# Patient Record
Sex: FEMALE | Race: BLACK OR AFRICAN AMERICAN | NOT HISPANIC OR LATINO | Employment: OTHER | URBAN - METROPOLITAN AREA
[De-identification: names, ages, dates, MRNs, and addresses within clinical notes are randomized per-mention and may not be internally consistent; named-entity substitution may affect disease eponyms.]

---

## 2018-12-03 ENCOUNTER — HOSPITAL ENCOUNTER (OUTPATIENT)
Facility: MEDICAL CENTER | Age: 73
End: 2018-12-04
Attending: EMERGENCY MEDICINE | Admitting: HOSPITALIST
Payer: COMMERCIAL

## 2018-12-03 ENCOUNTER — APPOINTMENT (OUTPATIENT)
Dept: RADIOLOGY | Facility: MEDICAL CENTER | Age: 73
End: 2018-12-03
Attending: EMERGENCY MEDICINE
Payer: COMMERCIAL

## 2018-12-03 DIAGNOSIS — I50.9 ACUTE CONGESTIVE HEART FAILURE, UNSPECIFIED HEART FAILURE TYPE (HCC): ICD-10-CM

## 2018-12-03 DIAGNOSIS — N28.9 RENAL INSUFFICIENCY: ICD-10-CM

## 2018-12-03 DIAGNOSIS — R55 NEAR SYNCOPE: ICD-10-CM

## 2018-12-03 PROBLEM — N17.9 AKI (ACUTE KIDNEY INJURY) (HCC): Status: ACTIVE | Noted: 2018-12-03

## 2018-12-03 PROBLEM — I16.0 HYPERTENSIVE URGENCY: Status: ACTIVE | Noted: 2018-12-03

## 2018-12-03 PROBLEM — E11.9 DIABETES (HCC): Status: ACTIVE | Noted: 2018-12-03

## 2018-12-03 LAB
ALBUMIN SERPL BCP-MCNC: 3.4 G/DL (ref 3.2–4.9)
ALBUMIN/GLOB SERPL: 0.8 G/DL
ALP SERPL-CCNC: 94 U/L (ref 30–99)
ALT SERPL-CCNC: 35 U/L (ref 2–50)
ANION GAP SERPL CALC-SCNC: 7 MMOL/L (ref 0–11.9)
ANION GAP SERPL CALC-SCNC: 9 MMOL/L (ref 0–11.9)
APPEARANCE UR: CLEAR
AST SERPL-CCNC: 22 U/L (ref 12–45)
BACTERIA #/AREA URNS HPF: ABNORMAL /HPF
BASOPHILS # BLD AUTO: 0.4 % (ref 0–1.8)
BASOPHILS # BLD: 0.04 K/UL (ref 0–0.12)
BILIRUB SERPL-MCNC: 0.3 MG/DL (ref 0.1–1.5)
BILIRUB UR QL STRIP.AUTO: NEGATIVE
BNP SERPL-MCNC: 933 PG/ML (ref 0–100)
BUN SERPL-MCNC: 50 MG/DL (ref 8–22)
BUN SERPL-MCNC: 54 MG/DL (ref 8–22)
CALCIUM SERPL-MCNC: 8.1 MG/DL (ref 8.5–10.5)
CALCIUM SERPL-MCNC: 8.6 MG/DL (ref 8.5–10.5)
CHLORIDE SERPL-SCNC: 105 MMOL/L (ref 96–112)
CHLORIDE SERPL-SCNC: 106 MMOL/L (ref 96–112)
CO2 SERPL-SCNC: 23 MMOL/L (ref 20–33)
CO2 SERPL-SCNC: 24 MMOL/L (ref 20–33)
COLOR UR: YELLOW
CORTIS SERPL-MCNC: 14.7 UG/DL (ref 0–23)
CREAT SERPL-MCNC: 2.41 MG/DL (ref 0.5–1.4)
CREAT SERPL-MCNC: 2.44 MG/DL (ref 0.5–1.4)
CREAT UR-MCNC: 43.9 MG/DL
D DIMER PPP IA.FEU-MCNC: 1.29 UG/ML (FEU) (ref 0–0.5)
EOSINOPHIL # BLD AUTO: 0.09 K/UL (ref 0–0.51)
EOSINOPHIL NFR BLD: 0.9 % (ref 0–6.9)
EPI CELLS #/AREA URNS HPF: ABNORMAL /HPF
ERYTHROCYTE [DISTWIDTH] IN BLOOD BY AUTOMATED COUNT: 54.7 FL (ref 35.9–50)
GLOBULIN SER CALC-MCNC: 4.3 G/DL (ref 1.9–3.5)
GLUCOSE BLD-MCNC: 94 MG/DL (ref 65–99)
GLUCOSE SERPL-MCNC: 192 MG/DL (ref 65–99)
GLUCOSE SERPL-MCNC: 87 MG/DL (ref 65–99)
GLUCOSE UR STRIP.AUTO-MCNC: NEGATIVE MG/DL
HCT VFR BLD AUTO: 32.6 % (ref 37–47)
HGB BLD-MCNC: 9.9 G/DL (ref 12–16)
IMM GRANULOCYTES # BLD AUTO: 0.04 K/UL (ref 0–0.11)
IMM GRANULOCYTES NFR BLD AUTO: 0.4 % (ref 0–0.9)
INR PPP: 1.2 (ref 0.87–1.13)
KETONES UR STRIP.AUTO-MCNC: NEGATIVE MG/DL
LEUKOCYTE ESTERASE UR QL STRIP.AUTO: ABNORMAL
LIPASE SERPL-CCNC: 118 U/L (ref 11–82)
LYMPHOCYTES # BLD AUTO: 1.44 K/UL (ref 1–4.8)
LYMPHOCYTES NFR BLD: 14.9 % (ref 22–41)
MAGNESIUM SERPL-MCNC: 1.7 MG/DL (ref 1.5–2.5)
MCH RBC QN AUTO: 30.1 PG (ref 27–33)
MCHC RBC AUTO-ENTMCNC: 30.4 G/DL (ref 33.6–35)
MCV RBC AUTO: 99.1 FL (ref 81.4–97.8)
MICRO URNS: ABNORMAL
MONOCYTES # BLD AUTO: 0.92 K/UL (ref 0–0.85)
MONOCYTES NFR BLD AUTO: 9.5 % (ref 0–13.4)
NEUTROPHILS # BLD AUTO: 7.16 K/UL (ref 2–7.15)
NEUTROPHILS NFR BLD: 73.9 % (ref 44–72)
NITRITE UR QL STRIP.AUTO: NEGATIVE
NRBC # BLD AUTO: 0 K/UL
NRBC BLD-RTO: 0 /100 WBC
PH UR STRIP.AUTO: 5.5 [PH]
PLATELET # BLD AUTO: 325 K/UL (ref 164–446)
PMV BLD AUTO: 10.4 FL (ref 9–12.9)
POTASSIUM SERPL-SCNC: 3.9 MMOL/L (ref 3.6–5.5)
POTASSIUM SERPL-SCNC: 4.3 MMOL/L (ref 3.6–5.5)
PROT SERPL-MCNC: 7.7 G/DL (ref 6–8.2)
PROT UR QL STRIP: 100 MG/DL
PROT UR-MCNC: 161.4 MG/DL (ref 0–15)
PROTHROMBIN TIME: 15.3 SEC (ref 12–14.6)
RBC # BLD AUTO: 3.29 M/UL (ref 4.2–5.4)
RBC # URNS HPF: ABNORMAL /HPF
RBC UR QL AUTO: ABNORMAL
SODIUM SERPL-SCNC: 136 MMOL/L (ref 135–145)
SODIUM SERPL-SCNC: 138 MMOL/L (ref 135–145)
SODIUM UR-SCNC: 99 MMOL/L
SP GR UR STRIP.AUTO: 1.02
TROPONIN I SERPL-MCNC: 0.03 NG/ML (ref 0–0.04)
UROBILINOGEN UR STRIP.AUTO-MCNC: 1 MG/DL
WBC # BLD AUTO: 9.7 K/UL (ref 4.8–10.8)
WBC #/AREA URNS HPF: ABNORMAL /HPF

## 2018-12-03 PROCEDURE — 83735 ASSAY OF MAGNESIUM: CPT

## 2018-12-03 PROCEDURE — 700111 HCHG RX REV CODE 636 W/ 250 OVERRIDE (IP): Performed by: HOSPITALIST

## 2018-12-03 PROCEDURE — 83880 ASSAY OF NATRIURETIC PEPTIDE: CPT

## 2018-12-03 PROCEDURE — 84300 ASSAY OF URINE SODIUM: CPT

## 2018-12-03 PROCEDURE — 84156 ASSAY OF PROTEIN URINE: CPT

## 2018-12-03 PROCEDURE — 82533 TOTAL CORTISOL: CPT

## 2018-12-03 PROCEDURE — 99285 EMERGENCY DEPT VISIT HI MDM: CPT

## 2018-12-03 PROCEDURE — 99220 PR INITIAL OBSERVATION CARE,LEVL III: CPT | Performed by: HOSPITALIST

## 2018-12-03 PROCEDURE — A9270 NON-COVERED ITEM OR SERVICE: HCPCS | Performed by: HOSPITALIST

## 2018-12-03 PROCEDURE — 80048 BASIC METABOLIC PNL TOTAL CA: CPT

## 2018-12-03 PROCEDURE — 96372 THER/PROPH/DIAG INJ SC/IM: CPT

## 2018-12-03 PROCEDURE — 36415 COLL VENOUS BLD VENIPUNCTURE: CPT

## 2018-12-03 PROCEDURE — 82570 ASSAY OF URINE CREATININE: CPT

## 2018-12-03 PROCEDURE — 700111 HCHG RX REV CODE 636 W/ 250 OVERRIDE (IP)

## 2018-12-03 PROCEDURE — 700105 HCHG RX REV CODE 258: Performed by: HOSPITALIST

## 2018-12-03 PROCEDURE — 84484 ASSAY OF TROPONIN QUANT: CPT

## 2018-12-03 PROCEDURE — 81001 URINALYSIS AUTO W/SCOPE: CPT

## 2018-12-03 PROCEDURE — G0378 HOSPITAL OBSERVATION PER HR: HCPCS

## 2018-12-03 PROCEDURE — 85610 PROTHROMBIN TIME: CPT

## 2018-12-03 PROCEDURE — 85025 COMPLETE CBC W/AUTO DIFF WBC: CPT

## 2018-12-03 PROCEDURE — 82962 GLUCOSE BLOOD TEST: CPT

## 2018-12-03 PROCEDURE — 700102 HCHG RX REV CODE 250 W/ 637 OVERRIDE(OP): Performed by: HOSPITALIST

## 2018-12-03 PROCEDURE — 80053 COMPREHEN METABOLIC PANEL: CPT

## 2018-12-03 PROCEDURE — 93005 ELECTROCARDIOGRAM TRACING: CPT | Performed by: EMERGENCY MEDICINE

## 2018-12-03 PROCEDURE — 96374 THER/PROPH/DIAG INJ IV PUSH: CPT

## 2018-12-03 PROCEDURE — 71045 X-RAY EXAM CHEST 1 VIEW: CPT

## 2018-12-03 PROCEDURE — 85379 FIBRIN DEGRADATION QUANT: CPT

## 2018-12-03 PROCEDURE — 83690 ASSAY OF LIPASE: CPT

## 2018-12-03 RX ORDER — BISACODYL 10 MG
10 SUPPOSITORY, RECTAL RECTAL
Status: DISCONTINUED | OUTPATIENT
Start: 2018-12-03 | End: 2018-12-04 | Stop reason: HOSPADM

## 2018-12-03 RX ORDER — INSULIN GLARGINE 100 [IU]/ML
10 INJECTION, SOLUTION SUBCUTANEOUS
Status: DISCONTINUED | OUTPATIENT
Start: 2018-12-03 | End: 2018-12-04 | Stop reason: HOSPADM

## 2018-12-03 RX ORDER — ASPIRIN 81 MG/1
81 TABLET, CHEWABLE ORAL DAILY
COMMUNITY

## 2018-12-03 RX ORDER — FUROSEMIDE 10 MG/ML
20 INJECTION INTRAMUSCULAR; INTRAVENOUS ONCE
Status: COMPLETED | OUTPATIENT
Start: 2018-12-03 | End: 2018-12-03

## 2018-12-03 RX ORDER — SODIUM CHLORIDE 9 MG/ML
INJECTION, SOLUTION INTRAVENOUS CONTINUOUS
Status: DISCONTINUED | OUTPATIENT
Start: 2018-12-03 | End: 2018-12-04 | Stop reason: HOSPADM

## 2018-12-03 RX ORDER — AMOXICILLIN 250 MG
2 CAPSULE ORAL 2 TIMES DAILY
Status: DISCONTINUED | OUTPATIENT
Start: 2018-12-04 | End: 2018-12-04 | Stop reason: HOSPADM

## 2018-12-03 RX ORDER — FUROSEMIDE 40 MG/1
40 TABLET ORAL DAILY
Status: ON HOLD | COMMUNITY
End: 2018-12-04

## 2018-12-03 RX ORDER — HYDRALAZINE HYDROCHLORIDE 25 MG/1
25 TABLET, FILM COATED ORAL 3 TIMES DAILY
Status: DISCONTINUED | OUTPATIENT
Start: 2018-12-03 | End: 2018-12-04 | Stop reason: HOSPADM

## 2018-12-03 RX ORDER — HYDRALAZINE HYDROCHLORIDE 10 MG/1
5 TABLET, FILM COATED ORAL EVERY 6 HOURS PRN
Status: DISCONTINUED | OUTPATIENT
Start: 2018-12-03 | End: 2018-12-04 | Stop reason: HOSPADM

## 2018-12-03 RX ORDER — ACETAMINOPHEN 325 MG/1
650 TABLET ORAL EVERY 6 HOURS PRN
Status: DISCONTINUED | OUTPATIENT
Start: 2018-12-03 | End: 2018-12-04 | Stop reason: HOSPADM

## 2018-12-03 RX ORDER — ISOSORBIDE DINITRATE 10 MG/1
10 TABLET ORAL 3 TIMES DAILY
COMMUNITY

## 2018-12-03 RX ORDER — HYDRALAZINE HYDROCHLORIDE 25 MG/1
25 TABLET, FILM COATED ORAL 3 TIMES DAILY
COMMUNITY

## 2018-12-03 RX ORDER — ISOSORBIDE DINITRATE 10 MG/1
10 TABLET ORAL 3 TIMES DAILY
Status: DISCONTINUED | OUTPATIENT
Start: 2018-12-03 | End: 2018-12-04 | Stop reason: HOSPADM

## 2018-12-03 RX ORDER — CARVEDILOL 25 MG/1
25 TABLET ORAL 2 TIMES DAILY WITH MEALS
COMMUNITY

## 2018-12-03 RX ORDER — FUROSEMIDE 40 MG/1
40 TABLET ORAL DAILY
Status: DISCONTINUED | OUTPATIENT
Start: 2018-12-04 | End: 2018-12-03

## 2018-12-03 RX ORDER — HEPARIN SODIUM 5000 [USP'U]/ML
5000 INJECTION, SOLUTION INTRAVENOUS; SUBCUTANEOUS EVERY 8 HOURS
Status: DISCONTINUED | OUTPATIENT
Start: 2018-12-03 | End: 2018-12-04 | Stop reason: HOSPADM

## 2018-12-03 RX ORDER — BUDESONIDE AND FORMOTEROL FUMARATE DIHYDRATE 160; 4.5 UG/1; UG/1
2 AEROSOL RESPIRATORY (INHALATION)
COMMUNITY

## 2018-12-03 RX ORDER — CARVEDILOL 6.25 MG/1
25 TABLET ORAL 2 TIMES DAILY WITH MEALS
Status: DISCONTINUED | OUTPATIENT
Start: 2018-12-03 | End: 2018-12-04 | Stop reason: HOSPADM

## 2018-12-03 RX ORDER — HYDRALAZINE HYDROCHLORIDE 20 MG/ML
20 INJECTION INTRAMUSCULAR; INTRAVENOUS EVERY 6 HOURS PRN
Status: DISCONTINUED | OUTPATIENT
Start: 2018-12-03 | End: 2018-12-04 | Stop reason: HOSPADM

## 2018-12-03 RX ORDER — POLYETHYLENE GLYCOL 3350 17 G/17G
1 POWDER, FOR SOLUTION ORAL
Status: DISCONTINUED | OUTPATIENT
Start: 2018-12-03 | End: 2018-12-04 | Stop reason: HOSPADM

## 2018-12-03 RX ORDER — DEXTROSE MONOHYDRATE 25 G/50ML
25 INJECTION, SOLUTION INTRAVENOUS
Status: DISCONTINUED | OUTPATIENT
Start: 2018-12-03 | End: 2018-12-04 | Stop reason: HOSPADM

## 2018-12-03 RX ADMIN — HEPARIN SODIUM 5000 UNITS: 5000 INJECTION, SOLUTION INTRAVENOUS; SUBCUTANEOUS at 21:29

## 2018-12-03 RX ADMIN — HYDRALAZINE HYDROCHLORIDE 25 MG: 25 TABLET, FILM COATED ORAL at 21:15

## 2018-12-03 RX ADMIN — CARVEDILOL 25 MG: 6.25 TABLET, FILM COATED ORAL at 21:18

## 2018-12-03 RX ADMIN — INSULIN GLARGINE 10 UNITS: 100 INJECTION, SOLUTION SUBCUTANEOUS at 21:59

## 2018-12-03 RX ADMIN — SODIUM CHLORIDE: 9 INJECTION, SOLUTION INTRAVENOUS at 21:16

## 2018-12-03 RX ADMIN — ISOSORBIDE DINITRATE 10 MG: 10 TABLET ORAL at 21:30

## 2018-12-03 RX ADMIN — FUROSEMIDE 20 MG: 10 INJECTION, SOLUTION INTRAMUSCULAR; INTRAVENOUS at 17:44

## 2018-12-03 ASSESSMENT — COPD QUESTIONNAIRES
HAVE YOU SMOKED AT LEAST 100 CIGARETTES IN YOUR ENTIRE LIFE: YES
COPD SCREENING SCORE: 4
DURING THE PAST 4 WEEKS HOW MUCH DID YOU FEEL SHORT OF BREATH: NONE/LITTLE OF THE TIME
DO YOU EVER COUGH UP ANY MUCUS OR PHLEGM?: NO/ONLY WITH OCCASIONAL COLDS OR INFECTIONS

## 2018-12-03 ASSESSMENT — PATIENT HEALTH QUESTIONNAIRE - PHQ9
1. LITTLE INTEREST OR PLEASURE IN DOING THINGS: NOT AT ALL
SUM OF ALL RESPONSES TO PHQ9 QUESTIONS 1 AND 2: 0
2. FEELING DOWN, DEPRESSED, IRRITABLE, OR HOPELESS: NOT AT ALL

## 2018-12-03 ASSESSMENT — PAIN SCALES - GENERAL
PAINLEVEL_OUTOF10: 0
PAINLEVEL_OUTOF10: 5

## 2018-12-03 ASSESSMENT — LIFESTYLE VARIABLES
EVER_SMOKED: NEVER
EVER_SMOKED: YES
DO YOU DRINK ALCOHOL: NO

## 2018-12-03 NOTE — ED NOTES
Pt incontinent of urine. Linen and pt cleaned. Pt slow to answer questions, seemingly confused however answers questions correctly. Pt and family updated on POC.

## 2018-12-03 NOTE — ED TRIAGE NOTES
Chief Complaint   Patient presents with   • Near Syncopal     Patient bib by ems for above complaint. Patient is visiting from [facility] since Wednesday. Patient was about to fly home today and was on the plane and they called ems. When EMS arrived she had a near syncopal event. -loc and was assisted to the groune. Last Blood sugar in field was 157. Patient states that she had stomach pains last night and here left ear has been hurting. Patient A+Ox4 and tearful. Denies chest pain.     Chief Complaint   Patient presents with   • Near Syncopal

## 2018-12-03 NOTE — ED NOTES
Med rec updated and complete.  Allergies reviewed.  Pt denies antibiotic use in last 30 days at home.  All morning doses taken.

## 2018-12-04 ENCOUNTER — PATIENT OUTREACH (OUTPATIENT)
Dept: HEALTH INFORMATION MANAGEMENT | Facility: OTHER | Age: 73
End: 2018-12-04

## 2018-12-04 ENCOUNTER — APPOINTMENT (OUTPATIENT)
Dept: RADIOLOGY | Facility: MEDICAL CENTER | Age: 73
End: 2018-12-04
Attending: HOSPITALIST
Payer: COMMERCIAL

## 2018-12-04 ENCOUNTER — APPOINTMENT (OUTPATIENT)
Dept: CARDIOLOGY | Facility: MEDICAL CENTER | Age: 73
End: 2018-12-04
Attending: HOSPITALIST
Payer: COMMERCIAL

## 2018-12-04 VITALS
OXYGEN SATURATION: 98 % | DIASTOLIC BLOOD PRESSURE: 72 MMHG | TEMPERATURE: 97.6 F | BODY MASS INDEX: 40.82 KG/M2 | HEART RATE: 69 BPM | RESPIRATION RATE: 18 BRPM | SYSTOLIC BLOOD PRESSURE: 164 MMHG | HEIGHT: 63 IN | WEIGHT: 230.38 LBS

## 2018-12-04 PROBLEM — R53.1 GENERALIZED WEAKNESS: Status: ACTIVE | Noted: 2018-12-04

## 2018-12-04 PROBLEM — D64.9 ANEMIA: Status: ACTIVE | Noted: 2018-12-04

## 2018-12-04 PROBLEM — R79.89 D-DIMER, ELEVATED: Status: ACTIVE | Noted: 2018-12-04

## 2018-12-04 PROBLEM — I16.0 HYPERTENSIVE URGENCY: Status: RESOLVED | Noted: 2018-12-03 | Resolved: 2018-12-04

## 2018-12-04 PROBLEM — R94.31 QT PROLONGATION: Status: ACTIVE | Noted: 2018-12-04

## 2018-12-04 PROBLEM — I50.9 CONGESTIVE HEART FAILURE (CHF) (HCC): Status: ACTIVE | Noted: 2018-12-04

## 2018-12-04 PROBLEM — I50.22 CHRONIC SYSTOLIC CONGESTIVE HEART FAILURE (HCC): Status: ACTIVE | Noted: 2018-12-04

## 2018-12-04 PROBLEM — R55 SYNCOPE: Status: RESOLVED | Noted: 2018-12-03 | Resolved: 2018-12-04

## 2018-12-04 PROBLEM — R53.1 GENERALIZED WEAKNESS: Status: RESOLVED | Noted: 2018-12-04 | Resolved: 2018-12-04

## 2018-12-04 LAB
ANION GAP SERPL CALC-SCNC: 9 MMOL/L (ref 0–11.9)
BUN SERPL-MCNC: 48 MG/DL (ref 8–22)
CALCIUM SERPL-MCNC: 7.6 MG/DL (ref 8.5–10.5)
CHLORIDE SERPL-SCNC: 108 MMOL/L (ref 96–112)
CO2 SERPL-SCNC: 21 MMOL/L (ref 20–33)
CREAT SERPL-MCNC: 2.35 MG/DL (ref 0.5–1.4)
ERYTHROCYTE [DISTWIDTH] IN BLOOD BY AUTOMATED COUNT: 55.1 FL (ref 35.9–50)
FERRITIN SERPL-MCNC: 7.6 NG/ML (ref 10–291)
GLUCOSE BLD-MCNC: 145 MG/DL (ref 65–99)
GLUCOSE BLD-MCNC: 56 MG/DL (ref 65–99)
GLUCOSE SERPL-MCNC: 150 MG/DL (ref 65–99)
HCT VFR BLD AUTO: 27.5 % (ref 37–47)
HGB BLD-MCNC: 8.3 G/DL (ref 12–16)
HGB RETIC QN AUTO: 27.4 PG/CELL (ref 29–35)
IMM RETICS NFR: 27.5 % (ref 9.3–17.4)
IRON SATN MFR SERPL: 7 % (ref 15–55)
IRON SERPL-MCNC: 21 UG/DL (ref 40–170)
LV EJECT FRACT  99904: 45
LV EJECT FRACT MOD 2C 99903: 34.89
LV EJECT FRACT MOD 4C 99902: 53.46
LV EJECT FRACT MOD BP 99901: 48.02
MCH RBC QN AUTO: 30.4 PG (ref 27–33)
MCHC RBC AUTO-ENTMCNC: 30.2 G/DL (ref 33.6–35)
MCV RBC AUTO: 100.7 FL (ref 81.4–97.8)
PLATELET # BLD AUTO: 284 K/UL (ref 164–446)
PMV BLD AUTO: 10.8 FL (ref 9–12.9)
POTASSIUM SERPL-SCNC: 4.3 MMOL/L (ref 3.6–5.5)
RBC # BLD AUTO: 2.73 M/UL (ref 4.2–5.4)
RETICS # AUTO: 0.08 M/UL (ref 0.04–0.06)
RETICS/RBC NFR: 2.8 % (ref 0.8–2.1)
SODIUM SERPL-SCNC: 138 MMOL/L (ref 135–145)
TIBC SERPL-MCNC: 314 UG/DL (ref 250–450)
WBC # BLD AUTO: 9.3 K/UL (ref 4.8–10.8)

## 2018-12-04 PROCEDURE — 700111 HCHG RX REV CODE 636 W/ 250 OVERRIDE (IP): Performed by: HOSPITALIST

## 2018-12-04 PROCEDURE — 96372 THER/PROPH/DIAG INJ SC/IM: CPT

## 2018-12-04 PROCEDURE — 83540 ASSAY OF IRON: CPT

## 2018-12-04 PROCEDURE — 99217 PR OBSERVATION CARE DISCHARGE: CPT | Performed by: HOSPITALIST

## 2018-12-04 PROCEDURE — 700102 HCHG RX REV CODE 250 W/ 637 OVERRIDE(OP): Performed by: HOSPITALIST

## 2018-12-04 PROCEDURE — 82962 GLUCOSE BLOOD TEST: CPT | Mod: 91

## 2018-12-04 PROCEDURE — 93306 TTE W/DOPPLER COMPLETE: CPT

## 2018-12-04 PROCEDURE — A9270 NON-COVERED ITEM OR SERVICE: HCPCS | Performed by: HOSPITALIST

## 2018-12-04 PROCEDURE — 76775 US EXAM ABDO BACK WALL LIM: CPT

## 2018-12-04 PROCEDURE — 85046 RETICYTE/HGB CONCENTRATE: CPT

## 2018-12-04 PROCEDURE — G0378 HOSPITAL OBSERVATION PER HR: HCPCS

## 2018-12-04 PROCEDURE — 83550 IRON BINDING TEST: CPT

## 2018-12-04 PROCEDURE — 85027 COMPLETE CBC AUTOMATED: CPT

## 2018-12-04 PROCEDURE — 80048 BASIC METABOLIC PNL TOTAL CA: CPT

## 2018-12-04 PROCEDURE — 82728 ASSAY OF FERRITIN: CPT

## 2018-12-04 PROCEDURE — 93306 TTE W/DOPPLER COMPLETE: CPT | Mod: 26 | Performed by: INTERNAL MEDICINE

## 2018-12-04 RX ORDER — FERROUS GLUCONATE 324(38)MG
324 TABLET ORAL
Qty: 30 TAB | Refills: 3 | Status: SHIPPED | OUTPATIENT
Start: 2018-12-04

## 2018-12-04 RX ORDER — FERROUS GLUCONATE 324(38)MG
324 TABLET ORAL
Status: DISCONTINUED | OUTPATIENT
Start: 2018-12-04 | End: 2018-12-04 | Stop reason: HOSPADM

## 2018-12-04 RX ORDER — FERROUS GLUCONATE 324(38)MG
324 TABLET ORAL
Qty: 30 TAB | Refills: 3 | Status: SHIPPED | OUTPATIENT
Start: 2018-12-04 | End: 2018-12-04

## 2018-12-04 RX ADMIN — HYDRALAZINE HYDROCHLORIDE 25 MG: 25 TABLET, FILM COATED ORAL at 05:32

## 2018-12-04 RX ADMIN — ISOSORBIDE DINITRATE 10 MG: 10 TABLET ORAL at 05:34

## 2018-12-04 RX ADMIN — INSULIN GLARGINE 10 UNITS: 100 INJECTION, SOLUTION SUBCUTANEOUS at 05:37

## 2018-12-04 RX ADMIN — MAGNESIUM GLUCONATE 500 MG ORAL TABLET 400 MG: 500 TABLET ORAL at 05:35

## 2018-12-04 RX ADMIN — HEPARIN SODIUM 5000 UNITS: 5000 INJECTION, SOLUTION INTRAVENOUS; SUBCUTANEOUS at 05:32

## 2018-12-04 ASSESSMENT — ENCOUNTER SYMPTOMS
EYE PAIN: 0
SHORTNESS OF BREATH: 0
PALPITATIONS: 0
DIARRHEA: 0
MYALGIAS: 0
WEAKNESS: 1
PHOTOPHOBIA: 0
ABDOMINAL PAIN: 0
FOCAL WEAKNESS: 0
COUGH: 0
FLANK PAIN: 0
POLYDIPSIA: 0
BRUISES/BLEEDS EASILY: 0
HALLUCINATIONS: 0
NERVOUS/ANXIOUS: 0
SPUTUM PRODUCTION: 0
EYE DISCHARGE: 0
DOUBLE VISION: 0
CHILLS: 0
STRIDOR: 0
SEIZURES: 0
DIZZINESS: 1
FALLS: 0
BLURRED VISION: 0
HEMOPTYSIS: 0
BLOOD IN STOOL: 0
FEVER: 0
VOMITING: 0
SORE THROAT: 0
LOSS OF CONSCIOUSNESS: 0
SPEECH CHANGE: 0

## 2018-12-04 ASSESSMENT — PAIN SCALES - GENERAL: PAINLEVEL_OUTOF10: 0

## 2018-12-04 NOTE — ASSESSMENT & PLAN NOTE
Microcytic, unclear baseline  Could be anemia of chronic kidney disease  Continue to monitor  Transfuse for hemoglobin below 7  Checking thyroid studies

## 2018-12-04 NOTE — ASSESSMENT & PLAN NOTE
Patient has no chest pain or shortness of breath or tachycardia  Unclear why it was ordered by the EDP, low suspicion as of now  Continue to monitor clinically

## 2018-12-04 NOTE — PROGRESS NOTES
Assumed care of patient, bedside report received from ANNA Zamarripa. Respirations even and unlabored on 2 L, equal chest rise and fall noted. Monitors applied, call light within reach, plan of care updated. Assessment completed at this time.

## 2018-12-04 NOTE — ASSESSMENT & PLAN NOTE
Hydralazine PO and IV PRN   Nitrate & carvedilol   Not on ACI because    Consider adding amlodipine or other CCB if good EF on echo

## 2018-12-04 NOTE — ASSESSMENT & PLAN NOTE
Patient and  report history of congestive heart failure  Not currently in exacerbation  Carvedilol, hydralazine and nitrate as she is an -American  Check an echo given her syncope

## 2018-12-04 NOTE — ED PROVIDER NOTES
ED Provider Note    CHIEF COMPLAINT  Chief Complaint   Patient presents with   • Near Syncopal       HPI  Amy Gutierrez is a 73 y.o. female who presents to the emergency room complaining of syncopal episode.  The patient has a history of congestive heart failure and diabetes and is out in [facility] from [place].  She is getting on a plane had an episode where she got very lightheaded and then became unresponsive and had a syncopal episode.  No chest pain.  She has had shortness of breath which is been short of breath since arriving she reports being compliant with her medications.  She denies any other aggravating or alleviating factors or associated complaints.  No paraspinal leg.  No history of PE or DVT.  No chronic anticoagulation use    REVIEW OF SYSTEMS  See HPI for further details. All other systems are negative.    PAST MEDICAL HISTORY  Past Medical History:   Diagnosis Date   • Congestive heart failure (HCC)    • Diabetes (HCC)        FAMILY HISTORY  History reviewed. No pertinent family history.    SOCIAL HISTORY  Social History     Social History   • Marital status:      Spouse name: N/A   • Number of children: N/A   • Years of education: N/A     Social History Main Topics   • Smoking status: Never Smoker   • Smokeless tobacco: Never Used   • Alcohol use No   • Drug use: No   • Sexual activity: Not on file     Other Topics Concern   • Not on file     Social History Narrative   • No narrative on file       SURGICAL HISTORY  History reviewed. No pertinent surgical history.    CURRENT MEDICATIONS  Home Medications     Reviewed by Nikole Adkins (Pharmacy Tech) on 12/03/18 at 1532  Med List Status: Complete   Medication Last Dose Status   carvedilol (COREG) 25 MG Tab 12/3/2018 Active   furosemide (LASIX) 40 MG Tab 12/3/2018 Active   hydrALAZINE (APRESOLINE) 25 MG Tab 12/3/2018 Active   insulin lispro prot & lispro (HUMALOG MIX) (75-25) unit/mL Suspension inj 12/3/2018 Active  "  isosorbide dinitrate (ISORDIL) 10 MG Tab 12/3/2018 Active                ALLERGIES  No Known Allergies    PHYSICAL EXAM  VITAL SIGNS: BP (!) 165/76   Pulse 66   Temp 36 °C (96.8 °F) (Temporal)   Resp 18   Ht 1.676 m (5' 6\")   Wt 86.7 kg (191 lb 2.2 oz)   SpO2 95%   BMI 30.85 kg/m²      Constitutional: Well developed, Well nourished, No acute distress, Non-toxic appearance.   HENT: Normocephalic, Atraumatic, Bilateral external ears normal, Oropharynx moist, No oral exudates, Nose normal.   Eyes: PERRL, EOMI, Conjunctiva normal, No discharge.   Neck: Normal range of motion, No tenderness, Supple, No stridor.     Cardiovascular: Normal heart rate, Normal rhythm, No murmurs, No rubs, No gallops.   Thorax & Lungs: Normal breath sounds, No respiratory distress, No wheezing, No chest tenderness.   Abdomen: Bowel sounds normal, Soft, No tenderness,   Skin: Warm, Dry, No erythema, No rash.   Back: No tenderness, No CVA tenderness.    Musculoskeletal: Good range of motion in all major joints.  Bilateral symmetric lower extremity edema  Neurologic: Alert, No focal deficits noted.   Psychiatric: Affect normal,    Results for orders placed or performed during the hospital encounter of 12/03/18   CBC WITH DIFFERENTIAL   Result Value Ref Range    WBC 9.7 4.8 - 10.8 K/uL    RBC 3.29 (L) 4.20 - 5.40 M/uL    Hemoglobin 9.9 (L) 12.0 - 16.0 g/dL    Hematocrit 32.6 (L) 37.0 - 47.0 %    MCV 99.1 (H) 81.4 - 97.8 fL    MCH 30.1 27.0 - 33.0 pg    MCHC 30.4 (L) 33.6 - 35.0 g/dL    RDW 54.7 (H) 35.9 - 50.0 fL    Platelet Count 325 164 - 446 K/uL    MPV 10.4 9.0 - 12.9 fL    Neutrophils-Polys 73.90 (H) 44.00 - 72.00 %    Lymphocytes 14.90 (L) 22.00 - 41.00 %    Monocytes 9.50 0.00 - 13.40 %    Eosinophils 0.90 0.00 - 6.90 %    Basophils 0.40 0.00 - 1.80 %    Immature Granulocytes 0.40 0.00 - 0.90 %    Nucleated RBC 0.00 /100 WBC    Neutrophils (Absolute) 7.16 (H) 2.00 - 7.15 K/uL    Lymphs (Absolute) 1.44 1.00 - 4.80 K/uL    Monos " (Absolute) 0.92 (H) 0.00 - 0.85 K/uL    Eos (Absolute) 0.09 0.00 - 0.51 K/uL    Baso (Absolute) 0.04 0.00 - 0.12 K/uL    Immature Granulocytes (abs) 0.04 0.00 - 0.11 K/uL    NRBC (Absolute) 0.00 K/uL   COMP METABOLIC PANEL   Result Value Ref Range    Sodium 136 135 - 145 mmol/L    Potassium 3.9 3.6 - 5.5 mmol/L    Chloride 105 96 - 112 mmol/L    Co2 24 20 - 33 mmol/L    Anion Gap 7.0 0.0 - 11.9    Glucose 87 65 - 99 mg/dL    Bun 54 (H) 8 - 22 mg/dL    Creatinine 2.44 (H) 0.50 - 1.40 mg/dL    Calcium 8.6 8.5 - 10.5 mg/dL    AST(SGOT) 22 12 - 45 U/L    ALT(SGPT) 35 2 - 50 U/L    Alkaline Phosphatase 94 30 - 99 U/L    Total Bilirubin 0.3 0.1 - 1.5 mg/dL    Albumin 3.4 3.2 - 4.9 g/dL    Total Protein 7.7 6.0 - 8.2 g/dL    Globulin 4.3 (H) 1.9 - 3.5 g/dL    A-G Ratio 0.8 g/dL   LIPASE   Result Value Ref Range    Lipase 118 (H) 11 - 82 U/L   TROPONIN   Result Value Ref Range    Troponin I 0.03 0.00 - 0.04 ng/mL   BTYPE NATRIURETIC PEPTIDE   Result Value Ref Range    B Natriuretic Peptide 933 (H) 0 - 100 pg/mL   ESTIMATED GFR   Result Value Ref Range    GFR If  23 (A) >60 mL/min/1.73 m 2    GFR If Non  19 (A) >60 mL/min/1.73 m 2   EKG   Result Value Ref Range    Report       [facility] Emergency Dept.    Test Date:  2018  Pt Name:    RAMSEY MUNIZ               Department: ER  MRN:        [id number]                      Room:       RD 12  Gender:     Female                       Technician: [id number]  :        1945                   Requested By:ER TRIAGE PROTOCOL  Order #:    [id number]                    Reading MD:    Measurements  Intervals                                Axis  Rate:       66                           P:          63  OK:         208                          QRS:        25  QRSD:       100                          T:          50  QT:         488  QTc:        512    Interpretive Statements  SINUS RHYTHM  INFERIOR INFARCT, AGE  INDETERMINATE  PROLONGED QT INTERVAL  No previous ECG available for comparison          RADIOLOGY/PROCEDURES  DX-CHEST-PORTABLE (1 VIEW)   Final Result      1.  Mild diffuse interstitial prominence which can be seen in the setting of edema and/or infection.   2.  Mild enlargement of the cardiomediastinal silhouette.            COURSE & MEDICAL DECISION MAKING  Pertinent Labs & Imaging studies reviewed. (See chart for details)    Patient presents with a syncopal episode, and shortness of breath.  Symptoms are consistent with CHF and cardiac etiology for syncope at least possibly.    Broad reversal diagnosis was considered including but not limited to ACS PE MI dissection dehydration to name a few.    The patient's workup suggest congestive heart failure.  Elevated BUN and creatinine of unclear chronicity.    The patient a single episode I think she should be admitted for observation and workup concerning his abnormal labs and the lack of baseline.  She will be admitted to the hospital service for continued workup and treatment       FINAL IMPRESSION  1. Near syncope    2. Acute congestive heart failure, unspecified heart failure type (HCC)    3. Renal insufficiency        2.   3.         Electronically signed by: Jose Miguel Holliday, 12/3/2018 4:39 PM

## 2018-12-04 NOTE — H&P
Hospital Medicine History & Physical Note    Date of Service  12/4/2018    Primary Care Physician  Pcp Unknown    Consultants  N/A    Code Status  FULL     Chief Complaint  Syncope    History of Presenting Illness  73 y.o. -American female past medical history insulin-dependent diabetes, congestive heart failure that is controlled presented 12/3/2018 with a syncopal episode.  Patient is visiting [place] with her  she lives in [place] and today while getting on the plane she got dizzy and lightheaded then became unresponsive and fell to the ground.  She does not remember what happened very well.  But she denies having chest pain, heart racing before the episode.  She denies having tongue biting or mouth soreness, or urinary or fecal incontinence.  In the emergency department the patient was found to have elevated creatinine of 2.41, with an estimated GFR of 24 consistent with stage IV kidney disease.  However the patient and her  denies any previous history of kidney disease.     Review of Systems  Review of Systems   Constitutional: Positive for malaise/fatigue. Negative for chills and fever.   HENT: Negative for congestion, ear discharge, ear pain, sore throat and tinnitus.    Eyes: Negative for blurred vision, double vision, photophobia, pain and discharge.   Respiratory: Negative for cough, hemoptysis, sputum production, shortness of breath and stridor.    Cardiovascular: Negative for chest pain, palpitations and leg swelling.   Gastrointestinal: Negative for abdominal pain, blood in stool, diarrhea and vomiting.   Genitourinary: Negative for flank pain, hematuria and urgency.   Musculoskeletal: Negative for falls and myalgias.   Skin: Negative.    Neurological: Positive for dizziness and weakness. Negative for speech change, focal weakness, seizures and loss of consciousness.   Endo/Heme/Allergies: Negative for polydipsia. Does not bruise/bleed easily.   Psychiatric/Behavioral: Negative  for hallucinations and suicidal ideas. The patient is not nervous/anxious.        Past Medical History   has a past medical history of Congestive heart failure (HCC) and Diabetes (HCC).    Surgical History   has a past surgical history that includes dental extraction(s).     Family History  family history includes Heart Attack in her mother.     Social History   reports that she quit smoking about 10 years ago. She smoked 0.50 packs per day. She has never used smokeless tobacco. She reports that she does not drink alcohol or use drugs.    Allergies  No Known Allergies    Medications  Prior to Admission Medications   Prescriptions Last Dose Informant Patient Reported? Taking?   aspirin (ASA) 81 MG Chew Tab chewable tablet 12/3/2018 at 0800  Yes Yes   Sig: Take 81 mg by mouth every day.   budesonide-formoterol (SYMBICORT) 160-4.5 MCG/ACT Aerosol 12/2/2018 at 2100 Patient Yes Yes   Sig: Inhale 2 Puffs by mouth 1 time daily as needed.   carvedilol (COREG) 25 MG Tab 12/3/2018 at 0830 Patient Yes Yes   Sig: Take 25 mg by mouth 2 times a day, with meals.   furosemide (LASIX) 40 MG Tab 12/3/2018 at 0830 Patient Yes Yes   Sig: Take 40 mg by mouth every day.   hydrALAZINE (APRESOLINE) 25 MG Tab 12/3/2018 at 0830 Patient Yes Yes   Sig: Take 25 mg by mouth 3 times a day.   insulin lispro prot & lispro (HUMALOG MIX) (75-25) unit/mL Suspension inj 12/3/2018 at 0830 Patient Yes Yes   Sig: Inject 40-50 Units as instructed 2 Times a Day. 50 units in AM  40 units in PM   isosorbide dinitrate (ISORDIL) 10 MG Tab 12/3/2018 at 0830 Patient Yes Yes   Sig: Take 10 mg by mouth 3 times a day.      Facility-Administered Medications: None       Physical Exam  Temp:  [36 °C (96.8 °F)-36.6 °C (97.8 °F)] 36.6 °C (97.8 °F)  Pulse:  [65-81] 80  Resp:  [17-18] 17  BP: (133-186)/(63-76) 137/63    Physical Exam   Constitutional: She is oriented to person, place, and time. She appears well-developed and well-nourished. No distress.   HENT:   Head:  Normocephalic and atraumatic.   Right Ear: External ear normal.   Left Ear: External ear normal.   Dry mucous membranes   Eyes: Pupils are equal, round, and reactive to light. Conjunctivae are normal. Right eye exhibits no discharge. Left eye exhibits no discharge.   Neck: Normal range of motion. Neck supple. No JVD present. No tracheal deviation present. No thyromegaly present.   Cardiovascular: Normal rate and regular rhythm.  Exam reveals no gallop and no friction rub.    No murmur heard.  Pulmonary/Chest: Effort normal and breath sounds normal. No stridor. No respiratory distress. She has no wheezes. She has no rales. She exhibits no tenderness.   Abdominal: Soft. Bowel sounds are normal. She exhibits no distension. There is no tenderness. There is no rebound and no guarding.   Musculoskeletal: Normal range of motion. She exhibits edema. She exhibits no tenderness or deformity.   Neurological: She is alert and oriented to person, place, and time. No cranial nerve deficit. Coordination normal.   Skin: Skin is warm and dry. No rash noted. She is not diaphoretic. No erythema. No pallor.   Psychiatric: She has a normal mood and affect. Her behavior is normal. Judgment normal.   Nursing note and vitals reviewed.      Laboratory:  Recent Labs      12/03/18   1448   WBC  9.7   RBC  3.29*   HEMOGLOBIN  9.9*   HEMATOCRIT  32.6*   MCV  99.1*   MCH  30.1   MCHC  30.4*   RDW  54.7*   PLATELETCT  325   MPV  10.4     Recent Labs      12/03/18   1448  12/03/18   2240   SODIUM  136  138   POTASSIUM  3.9  4.3   CHLORIDE  105  106   CO2  24  23   GLUCOSE  87  192*   BUN  54*  50*   CREATININE  2.44*  2.41*   CALCIUM  8.6  8.1*     Recent Labs      12/03/18   1448  12/03/18   2240   ALTSGPT  35   --    ASTSGOT  22   --    ALKPHOSPHAT  94   --    TBILIRUBIN  0.3   --    LIPASE  118*   --    GLUCOSE  87  192*     Recent Labs      12/03/18   1448   INR  1.20*     Recent Labs      12/03/18   1448   BNPBTYPENAT  933*         Recent  Labs      12/03/18   1448   TROPONINI  0.03       Urinalysis:    Recent Labs      12/03/18   1749   SPECGRAVITY  1.020   GLUCOSEUR  Negative   KETONES  Negative   NITRITE  Negative   LEUKESTERAS  Trace*   WBCURINE  2-5   RBCURINE  0-2   BACTERIA  Moderate*   EPITHELCELL  Moderate*        Imaging:  US-RENAL   Final Result      Somewhat atrophic, echogenic right kidney suggesting medical renal disease or renal artery stenosis.      No evidence of hydronephrosis.      DX-CHEST-PORTABLE (1 VIEW)   Final Result      1.  Mild diffuse interstitial prominence which can be seen in the setting of edema and/or infection.   2.  Mild enlargement of the cardiomediastinal silhouette.      EC-ECHOCARDIOGRAM COMPLETE W/O CONT    (Results Pending)       EKG interpreted by me, sinus rhythm, rate 66, no significant ST elevation, no second or third-degree heart block, prolonged QT intervals,      Assessment/Plan:  I anticipate this patient is appropriate for observation status at this time.    * EDIE (acute kidney injury) (HCC)   Assessment & Plan    Mostly due to dehydration, poor oral intake, overdiuresis  Gentle intravenous fluids given her history of heart failure  Avoid nephrotoxins, monitor inputs and outputs  Renal ultrasound  UA, urine electrolytes     Hypertensive urgency- (present on admission)   Assessment & Plan    Hydralazine PO and IV PRN   Nitrate & carvedilol   Not on ACI because    Consider adding amlodipine or other CCB if good EF on echo     Syncope- (present on admission)   Assessment & Plan    EKG sinus rhythm, rate 66, no significant ST elevation, no second or third-degree heart block  Prolonged QT intervals,    Orthostatic vital  Telemetry monitor  Echocardiography  Random cortisol     Congestive heart failure (CHF) (HCC)- (present on admission)   Assessment & Plan    Patient and  report history of congestive heart failure  Not currently in exacerbation  Carvedilol, hydralazine  and nitrate as she is an -American  Check an echo given her syncope     Anemia- (present on admission)   Assessment & Plan    Microcytic, unclear baseline  Could be anemia of chronic kidney disease  Continue to monitor  Transfuse for hemoglobin below 7  Checking thyroid studies     QT prolongation- (present on admission)   Assessment & Plan    Prolonged QT intervals,  on EKG  Telemetry monitor  Monitor potassium and magnesium, with goals above 4 for potassium above 2 for magnesium  Avoid QT prolonging medications as much as possible     Generalized weakness- (present on admission)   Assessment & Plan    PT OT evaluation     Diabetes (HCC)- (present on admission)   Assessment & Plan    With hyperglycemia  Long & short acting insulin  Accu-Checks, hypoglycemia protocol     D-dimer, elevated- (present on admission)   Assessment & Plan    Patient has no chest pain or shortness of breath or tachycardia  Unclear why it was ordered by the EDP, low suspicion as of now  Continue to monitor clinically         VTE prophylaxis: Heparin

## 2018-12-04 NOTE — PROGRESS NOTES
A/o,  at bedside, mild effort of breathing noted,  respirations are even and unlabored on room air,assessment completed, vital signs stable except for elevated BP, medicated per MAR, SR on the monitor, IV fluids running per orders, updated communication board,  poc discussed and understood, verbalized understanding, box meal given, all questions answered at this time , fall precautions in place, call button within reach, will continue to monitor

## 2018-12-04 NOTE — CARE PLAN
Problem: Safety  Goal: Will remain free from falls  Outcome: PROGRESSING AS EXPECTED  Call light education provide, pt completed return demonstration successfully. Re-enforced use of call light to ensure pt safety and decrease risk of fall.

## 2018-12-04 NOTE — ASSESSMENT & PLAN NOTE
Prolonged QT intervals,  on EKG  Telemetry monitor  Monitor potassium and magnesium, with goals above 4 for potassium above 2 for magnesium  Avoid QT prolonging medications as much as possible

## 2018-12-04 NOTE — PROGRESS NOTES
"Cardiovascular Nurse Navigator () Advanced Heart Failure Program Inpatient Progress Note:    Per Dr. Kowalski on 12/3/18 patient who is visiting the [facility] has a stated history of HF but she is not currently decompensated. Syncope and EDIE. Therefore, a seven day HF f/u appt is not currently indicated.    Should clinical status change to acute HF, patient will require a seven calendar day f/u appt which can be achieved by placing a \"schedule heart failure follow up appointment\" order per protocol or by calling the hospital schedulers at [phone].     Respectfully request that problems list be updated to reflect that the HF is chronic and not acutely decompensated so that staff are not confused about necessary interventions and patient does not receive a seven day HF follow up appointment which should be saved for HF patients who had exacerbations.    Thank you and please call with questions or concerns.    "

## 2018-12-04 NOTE — DISCHARGE PLANNING
Care Transition Team Assessment    Spoke with patient ans spouse at bedside with patient at bedside with verbal consent. Anticipate no needs @ present time. Spouse will be ride @ D/C.  Information Source  Orientation : Oriented x 4  Information Given By: Patient    Readmission Evaluation  Is this a readmission?: No    Interdisciplinary Discharge Planning  Does Admitting Nurse Feel This Could be a Complex Discharge?: No  Primary Care Physician: Family Physician in [place]  Patient or legal guardian wants to designate a caregiver (see row info): No  Support Systems: Spouse / Significant Other  Do You Take your Prescribed Medications Regularly: Yes  Able to Return to Previous ADL's: Yes  Mobility Issues: Yes  Prior Services: None  Patient Expects to be Discharged to:: Home  Assistance Needed: No  Durable Medical Equipment: Other - Specify (Cane)    Discharge Preparedness  What are your discharge supports?: Spouse  Prior Functional Level: Uses Cane  Difficulity with ADLs: None    Functional Assesment  Prior Functional Level: Uses Cane    Finances  Prescription Coverage: Yes  Anticipated Discharge Information  Anticipated discharge disposition: Home  Discharge Address: [address] [place] [address]  Discharge Contact Phone Number: [phone]

## 2018-12-04 NOTE — PROGRESS NOTES
"Pt ambulated with tech, I followed up with patient moments after pt returning to room. Assessment completed, pt c/o minimal SOB and states \"I normally feel that way after walking.\" Per assessment and pt, pt return to baseline.  "

## 2018-12-04 NOTE — DISCHARGE INSTRUCTIONS
Discharge Instructions    Discharged to home by car with relative. Discharged via wheelchair, hospital escort: Yes.  Special equipment needed: Not Applicable    Be sure to schedule a follow-up appointment with your primary care doctor or any specialists as instructed.     Discharge Plan:   Diet Plan: Discussed  Activity Level: Discussed  Confirmed Follow up Appointment: Appointment Scheduled  Confirmed Symptoms Management: Discussed  Medication Reconciliation Updated: No (Comments)  Pneumococcal Vaccine Administered/Refused: Not given - Patient refused pneumococcal vaccine (educated)  Influenza Vaccine Indication: Patient Refuses    I understand that a diet low in cholesterol, fat, and sodium is recommended for good health. Unless I have been given specific instructions below for another diet, I accept this instruction as my diet prescription.   Other diet: heart healthy diet    Special Instructions: None    · Is patient discharged on Warfarin / Coumadin?   No     Activity: As tolerated.   Diet: Renal Cardiac   Followup: With Primary care doctor, Nephrology and Cardiology in [place]   Instructions:   -Please make appointments when you get home   - Show test results to primary doctor.   -Given instructions to return to the ER if any new or worsening symptoms, worsening condition, or failure to improve   -Call PCP for followup   -No smoking, no alcohol, no caffeine   -Encourage risk factor reduction with tobacco and alcohol abstinence, diet changes, weight loss, and exercise.      Heart Failure  Heart failure means your heart has trouble pumping blood. This makes it hard for your body to work well. Heart failure is usually a long-term (chronic) condition. You must take good care of yourself and follow your doctor's treatment plan.  HOME CARE  · Take your heart medicine as told by your doctor.  ¨ Do not stop taking medicine unless your doctor tells you to.  ¨ Do not skip any dose of medicine.  ¨ Refill your  medicines before they run out.  ¨ Take other medicines only as told by your doctor or pharmacist.  · Stay active if told by your doctor. The elderly and people with severe heart failure should talk with a doctor about physical activity.  · Eat heart-healthy foods. Choose foods that are without trans fat and are low in saturated fat, cholesterol, and salt (sodium). This includes fresh or frozen fruits and vegetables, fish, lean meats, fat-free or low-fat dairy foods, whole grains, and high-fiber foods. Lentils and dried peas and beans (legumes) are also good choices.  · Limit salt if told by your doctor.  · Cook in a healthy way. Roast, grill, broil, bake, poach, steam, or stir-welsh foods.  · Limit fluids as told by your doctor.  · Weigh yourself every morning. Do this after you pee (urinate) and before you eat breakfast. Write down your weight to give to your doctor.  · Take your blood pressure and write it down if your doctor tells you to.  · Ask your doctor how to check your pulse. Check your pulse as told.  · Lose weight if told by your doctor.  · Stop smoking or chewing tobacco. Do not use gum or patches that help you quit without your doctor's approval.  · Schedule and go to doctor visits as told.  · Nonpregnant women should have no more than 1 drink a day. Men should have no more than 2 drinks a day. Talk to your doctor about drinking alcohol.  · Stop illegal drug use.  · Stay current with shots (immunizations).  · Manage your health conditions as told by your doctor.  · Learn to manage your stress.  · Rest when you are tired.  · If it is really hot outside:  ¨ Avoid intense activities.  ¨ Use air conditioning or fans, or get in a cooler place.  ¨ Avoid caffeine and alcohol.  ¨ Wear loose-fitting, lightweight, and light-colored clothing.  · If it is really cold outside:  ¨ Avoid intense activities.  ¨ Layer your clothing.  ¨ Wear mittens or gloves, a hat, and a scarf when going outside.  ¨ Avoid  alcohol.  · Learn about heart failure and get support as needed.  · Get help to maintain or improve your quality of life and your ability to care for yourself as needed.  GET HELP IF:   · You gain weight quickly.  · You are more short of breath than usual.  · You cannot do your normal activities.  · You tire easily.  · You cough more than normal, especially with activity.  · You have any or more puffiness (swelling) in areas such as your hands, feet, ankles, or belly (abdomen).  · You cannot sleep because it is hard to breathe.  · You feel like your heart is beating fast (palpitations).  · You get dizzy or light-headed when you stand up.  GET HELP RIGHT AWAY IF:   · You have trouble breathing.  · There is a change in mental status, such as becoming less alert or not being able to focus.  · You have chest pain or discomfort.  · You faint.  MAKE SURE YOU:   · Understand these instructions.  · Will watch your condition.  · Will get help right away if you are not doing well or get worse.  This information is not intended to replace advice given to you by your health care provider. Make sure you discuss any questions you have with your health care provider.  Document Released: 09/26/2009 Document Revised: 01/08/2016 Document Reviewed: 02/03/2014  [employer] Interactive Patient Education © 2017 [employer]    Depression / Suicide Risk    As you are discharged from this [facility] facility, it is important to learn how to keep safe from harming yourself.    Recognize the warning signs:  · Abrupt changes in personality, positive or negative- including increase in energy   · Giving away possessions  · Change in eating patterns- significant weight changes-  positive or negative  · Change in sleeping patterns- unable to sleep or sleeping all the time   · Unwillingness or inability to communicate  · Depression  · Unusual sadness, discouragement and loneliness  · Talk of wanting to die  · Neglect of personal  appearance   · Rebelliousness- reckless behavior  · Withdrawal from people/activities they love  · Confusion- inability to concentrate     If you or a loved one observes any of these behaviors or has concerns about self-harm, here's what you can do:  · Talk about it- your feelings and reasons for harming yourself  · Remove any means that you might use to hurt yourself (examples: pills, rope, extension cords, firearm)  · Get professional help from the community (Mental Health, Substance Abuse, psychological counseling)  · Do not be alone:Call your Safe Contact- someone whom you trust who will be there for you.  · Call your local CRISIS HOTLINE 825-1097 or 087-414-6630  · Call your local Children's Mobile Crisis Response Team [place] (952) 992-9299 or www.[employer]  · Call the toll free National Suicide Prevention Hotlines   · National Suicide Prevention Lifeline 747-574-FFVF (4980)  · National Hope Line Network 800-SUICIDE (947-5752)

## 2018-12-04 NOTE — ED NOTES
Dr. Kowalski notified of pt. Passing dysphagia screening. Pt.updated on the POC to be admitted to the hospital. Call light within reach. Will continue to monitor.

## 2018-12-04 NOTE — ASSESSMENT & PLAN NOTE
Mostly due to dehydration, poor oral intake, overdiuresis  Gentle intravenous fluids given her history of heart failure  Avoid nephrotoxins, monitor inputs and outputs  Renal ultrasound  UA, urine electrolytes

## 2018-12-04 NOTE — ED NOTES
Pt transferred to T204, Renee CASTILLO aware of pt's pending arrival, all belongings accounted for.

## 2018-12-04 NOTE — PROGRESS NOTES
IV removed. Discharge instructions provided and pt verbalizes understanding. Pt states that all question have been answered. Copy of discharge provided to pt and signed. Prescription: provided in writing. Pt states that all personal items are in possess. Pt escorted off unit by Tech without incident via wheelchair.

## 2018-12-04 NOTE — DISCHARGE SUMMARY
Hospital Medicine Discharge Note     Patient ID:  Amy Gutierrez  [id number]  73 y.o.female  1945    Admit Date:  12/3/2018       Discharge Date:   12/04/2018    Primary Care Provider: Pcp Unknown    Admitting Physician: Ronnie Kowalski M.D.  Discharging Physician: Julio Cummings M.D.     Chief Complaint: Syncope     Discharge Diagnoses:   Principal Problem:    EDIE (acute kidney injury) (HCC)-patient continues to have elevated creatinine however this is likely baseline for patient.  Ultrasound kidney shows chronic medical kidney disease.  Patient to follow with nephrology on return to [place]  Active Problems:    Syncope-no further episodes follow-up with PCP    Hypertensive urgency-continue with home medications follow-up with PCP and cardiology as needed    Diabetes (HCC)-continue outpatient management follow-up with PCP    Generalized weakness-improved, likely due to dehydration from overdiuresis    QT prolongation-monitor, avoid any QT prolonging drugs, follow-up with cardiology and return to [place]    Anemia-patient noted to have low iron levels, patient initiated on iron replacement therapy and suggested follow-up with PCP    Congestive heart failure (CHF) (HCC)-not in acute exacerbation at this time follow-up with cardiology on arrival in [place]    D-dimer, elevated-unclear source, low suspicion for PE      Chronic Medical Problems:  Past Medical History:   Diagnosis Date   • Congestive heart failure (HCC)    • Diabetes (HCC)        Code Status: Full Code    Hospital Summary:       Please refer to H&P by  Ronnie Kowalski M.D. on 12/3/2018 for full details.      In brief, Amy Gutierrez is a 73 y.o. female who was admitted 12/3/2018 for syncope.  Patient has a history of congestive heart failure, hypertension and insulin-dependent diabetes.  Patient was visiting [place] with her  on a trip from [place], when she arrived in the airport she became dizzy and  lightheaded and then fell to the ground with a syncopal episode.  Patient had positive LOC and does not remember the event.  Patient was noted to have elevated creatinine of 2.4 in the emergency room consistent with stage IV kidney disease, however patient has no known history of kidney disease at this time.   Patient was admitted to CDU for further workup and monitoring.  Patient provided gentle IV fluid hydration.  CBC on admission shows anemia however no elevated leukocytosis, iron studies showed patient had low iron levels and patient was initiated on Fergon.  CMP on admission shows elevated  creatinine of 2.44 as well as elevated BUN.  BNP was noted to be 933.  Troponin negative.  Chest x-ray shows mild diffuse interstitial prominence , with mild enlargement of the cardiomediastinal silhouette.  Renal ultrasound was obtained due to elevated creatinine which showed somewhat atrophic echogenic right kidney suggesting medical renal disease or renal artery stenosis.  No hydronephrosis was noted.  Echocardiogram was completed which shows mildly reduced left ventricular systolic function with mild concentric left ventricular hypertrophy and mild tricuspid regurgitation.  Patient stated that she has not been told in the past that she has any problems with her kidneys and was unaware that they were an issue.  Patient states she feels significantly better today, and wants to be discharged that she has a flight to catch at 1 PM.  Patient's vital signs are stable and blood pressure is controlled.  Patient is on room air maintaining saturations greater than 90%.  Patient has been encouraged to follow-up with PCP as well as nephrology and cardiology on return home.  Patient to catch a plane to return to [place] at 1 PM today, has ensured provider that she will be following up with PCP as soon as she arrives.  Patient states she feels she is back to her baseline and is requesting discharge at this time.  Patient's  creatinine improved to 2.35 with gentle fluid IV hydration, which was very restricted given patient's congestive heart failure history and elevated BNP.       Therefore, she is discharged in fair and stable condition with close outpatient follow-up.    Consultants:      None     Studies:    Imaging/ Testing:      EC-ECHOCARDIOGRAM COMPLETE W/O CONT   Final Result   CONCLUSIONS  Technically difficult and incomplete study.   Mildly reduced left ventricular systolic function.  Mild concentric left ventricular hypertrophy.  Mild tricuspid regurgitation.  Estimated right ventricular systolic pressure  is 55 mmHg.  No prior study is available for comparison.   US-RENAL   Final Result      Somewhat atrophic, echogenic right kidney suggesting medical renal disease or renal artery stenosis.      No evidence of hydronephrosis.      DX-CHEST-PORTABLE (1 VIEW)   Final Result      1.  Mild diffuse interstitial prominence which can be seen in the setting of edema and/or infection.   2.  Mild enlargement of the cardiomediastinal silhouette.            Laboratory:   Recent Labs      12/03/18   1448  12/04/18   0520   WBC  9.7  9.3   RBC  3.29*  2.73*   HEMOGLOBIN  9.9*  8.3*   HEMATOCRIT  32.6*  27.5*   MCV  99.1*  100.7*   MCH  30.1  30.4   MCHC  30.4*  30.2*   RDW  54.7*  55.1*   PLATELETCT  325  284   MPV  10.4  10.8       Recent Labs      12/03/18   1448  12/03/18 2240  12/04/18   0520   SODIUM  136  138  138   POTASSIUM  3.9  4.3  4.3   CHLORIDE  105  106  108   CO2  24  23  21   GLUCOSE  87  192*  150*   BUN  54*  50*  48*   CREATININE  2.44*  2.41*  2.35*   CALCIUM  8.6  8.1*  7.6*       Recent Labs      12/03/18   1448  12/03/18   2240  12/04/18   0520   ALTSGPT  35   --    --    ASTSGOT  22   --    --    ALKPHOSPHAT  94   --    --    TBILIRUBIN  0.3   --    --    LIPASE  118*   --    --    GLUCOSE  87  192*  150*        Recent Labs      12/03/18   1448   BNPBTYPENAT  933*             Recent Labs      12/03/18   1448    TROPONINI  0.03       Results     Procedure Component Value Units Date/Time    URINALYSIS CULTURE, IF INDICATED [[id number]]  (Abnormal) Collected:  12/03/18 1746    Order Status:  Completed Specimen:  Urine Updated:  12/03/18 1824     Color Yellow     Character Clear     Specific Gravity 1.020     Ph 5.5     Glucose Negative mg/dL      Ketones Negative mg/dL      Protein 100 (A) mg/dL      Bilirubin Negative     Urobilinogen, Urine 1.0     Nitrite Negative     Leukocyte Esterase Trace (A)     Occult Blood Trace (A)     Micro Urine Req Microscopic            Procedures/Surgeries:        None     Disposition:    Discharge home to [place]     Condition:  Stable    Instructions:   Activity: As tolerated.  Diet: Renal Cardiac   Followup: With Primary care doctor, Nephrology and Cardiology in [place]   Instructions:  -Please make appointments when you get home   - Show test results to primary doctor.   -Given instructions to return to the ER if any new or worsening symptoms, worsening condition, or failure to improve  -Call PCP for followup  -No smoking, no alcohol, no caffeine  -Encourage risk factor reduction with tobacco and alcohol abstinence, diet changes, weight loss, and exercise.     Follow-Up  Patient to follow up when she returns to [place]     Discharge Medications:             Medication List      START taking these medications      Instructions   ferrous gluconate 324 (38 Fe) MG Tabs  Commonly known as:  FERGON   Take 1 Tab by mouth every morning with breakfast.  Dose:  324 mg        CONTINUE taking these medications      Instructions   aspirin 81 MG Chew chewable tablet  Commonly known as:  ASA   Take 81 mg by mouth every day.  Dose:  81 mg     budesonide-formoterol 160-4.5 MCG/ACT Aero  Commonly known as:  SYMBICORT   Inhale 2 Puffs by mouth 1 time daily as needed.  Dose:  2 Puff     carvedilol 25 MG Tabs  Commonly known as:  COREG   Take 25 mg by mouth 2 times a day, with  meals.  Dose:  25 mg     hydrALAZINE 25 MG Tabs  Commonly known as:  APRESOLINE   Take 25 mg by mouth 3 times a day.  Dose:  25 mg     insulin lispro prot & lispro (75-25) unit/mL Susp inj  Commonly known as:  HUMALOG MIX   Inject 40-50 Units as instructed 2 Times a Day. 50 units in AM 40 units in PM  Dose:  40-50 Units     isosorbide dinitrate 10 MG Tabs  Commonly known as:  ISORDIL   Take 10 mg by mouth 3 times a day.  Dose:  10 mg        STOP taking these medications    furosemide 40 MG Tabs  Commonly known as:  LASIX                Total time of the discharge process exceeds 40 minutes. This included face to face with the patient, medication reconciliation, care co ordination with nursing  involved in patient care and discussion and co ordination with case management.     Please CC the above physicians    MATTHEW Lira  12/4/2018  8:57 AM

## 2018-12-04 NOTE — ASSESSMENT & PLAN NOTE
EKG sinus rhythm, rate 66, no significant ST elevation, no second or third-degree heart block  Prolonged QT intervals,    Orthostatic vital  Telemetry monitor  Echocardiography  Random cortisol

## 2018-12-05 ENCOUNTER — PATIENT OUTREACH (OUTPATIENT)
Dept: HEALTH INFORMATION MANAGEMENT | Facility: OTHER | Age: 73
End: 2018-12-05

## 2019-01-31 LAB — EKG IMPRESSION: NORMAL
